# Patient Record
Sex: FEMALE | Race: BLACK OR AFRICAN AMERICAN | Employment: OTHER | URBAN - METROPOLITAN AREA
[De-identification: names, ages, dates, MRNs, and addresses within clinical notes are randomized per-mention and may not be internally consistent; named-entity substitution may affect disease eponyms.]

---

## 2017-10-31 ENCOUNTER — HOSPITAL ENCOUNTER (OUTPATIENT)
Dept: CT IMAGING | Facility: HOSPITAL | Age: 67
Discharge: HOME | End: 2017-10-31
Attending: SPECIALIST
Payer: MEDICARE

## 2017-10-31 ENCOUNTER — LAB ENCOUNTER (OUTPATIENT)
Dept: LAB | Facility: HOSPITAL | Age: 67
End: 2017-10-31
Attending: SPECIALIST
Payer: MEDICARE

## 2017-10-31 DIAGNOSIS — R10.2 SUPRAPUBIC PAIN: ICD-10-CM

## 2017-10-31 DIAGNOSIS — R10.2 SUPRAPUBIC PAIN: Primary | ICD-10-CM

## 2017-10-31 PROCEDURE — 80053 COMPREHEN METABOLIC PANEL: CPT

## 2017-10-31 PROCEDURE — 74177 CT ABD & PELVIS W/CONTRAST: CPT | Performed by: SPECIALIST

## 2017-10-31 PROCEDURE — 85025 COMPLETE CBC W/AUTO DIFF WBC: CPT

## 2017-10-31 PROCEDURE — 36415 COLL VENOUS BLD VENIPUNCTURE: CPT

## 2019-06-10 ENCOUNTER — TELEPHONE (OUTPATIENT)
Dept: SURGERY | Facility: CLINIC | Age: 69
End: 2019-06-10

## 2021-07-22 ENCOUNTER — HOSPITAL ENCOUNTER (OUTPATIENT)
Facility: HOSPITAL | Age: 71
Setting detail: OBSERVATION
Discharge: NURSING, REHAB OR LONG-TERM CARE | DRG: 563 | End: 2021-07-27
Attending: EMERGENCY MEDICINE | Admitting: INTERNAL MEDICINE
Payer: MEDICARE

## 2021-07-22 ENCOUNTER — APPOINTMENT (OUTPATIENT)
Dept: GENERAL RADIOLOGY | Facility: HOSPITAL | Age: 71
DRG: 563 | End: 2021-07-22
Attending: EMERGENCY MEDICINE
Payer: MEDICARE

## 2021-07-22 DIAGNOSIS — R52 INTRACTABLE PAIN: ICD-10-CM

## 2021-07-22 DIAGNOSIS — M17.11 OSTEOARTHRITIS OF RIGHT KNEE, UNSPECIFIED OSTEOARTHRITIS TYPE: Primary | ICD-10-CM

## 2021-07-22 DIAGNOSIS — R45.851 SUICIDAL IDEATION: ICD-10-CM

## 2021-07-22 LAB
GLUCOSE BLDC GLUCOMTR-MCNC: 128 MG/DL (ref 70–99)
GLUCOSE BLDC GLUCOMTR-MCNC: 139 MG/DL (ref 70–99)

## 2021-07-22 PROCEDURE — 96372 THER/PROPH/DIAG INJ SC/IM: CPT

## 2021-07-22 PROCEDURE — 82962 GLUCOSE BLOOD TEST: CPT

## 2021-07-22 PROCEDURE — 96374 THER/PROPH/DIAG INJ IV PUSH: CPT

## 2021-07-22 PROCEDURE — 99285 EMERGENCY DEPT VISIT HI MDM: CPT

## 2021-07-22 PROCEDURE — 73560 X-RAY EXAM OF KNEE 1 OR 2: CPT | Performed by: EMERGENCY MEDICINE

## 2021-07-22 RX ORDER — DEXTROSE MONOHYDRATE 25 G/50ML
50 INJECTION, SOLUTION INTRAVENOUS
Status: DISCONTINUED | OUTPATIENT
Start: 2021-07-22 | End: 2021-07-27

## 2021-07-22 RX ORDER — SODIUM PHOSPHATE, DIBASIC AND SODIUM PHOSPHATE, MONOBASIC 7; 19 G/133ML; G/133ML
1 ENEMA RECTAL ONCE AS NEEDED
Status: DISCONTINUED | OUTPATIENT
Start: 2021-07-22 | End: 2021-07-27

## 2021-07-22 RX ORDER — ENOXAPARIN SODIUM 100 MG/ML
40 INJECTION SUBCUTANEOUS EVERY 24 HOURS
Status: DISCONTINUED | OUTPATIENT
Start: 2021-07-22 | End: 2021-07-27

## 2021-07-22 RX ORDER — ACETAMINOPHEN 325 MG/1
650 TABLET ORAL EVERY 4 HOURS PRN
Status: DISCONTINUED | OUTPATIENT
Start: 2021-07-22 | End: 2021-07-27

## 2021-07-22 RX ORDER — GABAPENTIN 300 MG/1
300 CAPSULE ORAL 3 TIMES DAILY
Status: DISCONTINUED | OUTPATIENT
Start: 2021-07-22 | End: 2021-07-26

## 2021-07-22 RX ORDER — CYCLOBENZAPRINE HCL 10 MG
10 TABLET ORAL NIGHTLY
Status: DISCONTINUED | OUTPATIENT
Start: 2021-07-22 | End: 2021-07-26

## 2021-07-22 RX ORDER — DOCUSATE SODIUM 100 MG/1
100 CAPSULE, LIQUID FILLED ORAL 2 TIMES DAILY
Status: DISCONTINUED | OUTPATIENT
Start: 2021-07-22 | End: 2021-07-27

## 2021-07-22 RX ORDER — HYDROCODONE BITARTRATE AND ACETAMINOPHEN 5; 325 MG/1; MG/1
2 TABLET ORAL EVERY 4 HOURS PRN
Status: DISCONTINUED | OUTPATIENT
Start: 2021-07-22 | End: 2021-07-24

## 2021-07-22 RX ORDER — TRAMADOL HYDROCHLORIDE 50 MG/1
50 TABLET ORAL ONCE
Status: COMPLETED | OUTPATIENT
Start: 2021-07-22 | End: 2021-07-22

## 2021-07-22 RX ORDER — TRAMADOL HYDROCHLORIDE 50 MG/1
50 TABLET ORAL EVERY 6 HOURS PRN
Status: DISCONTINUED | OUTPATIENT
Start: 2021-07-22 | End: 2021-07-27

## 2021-07-22 RX ORDER — POLYETHYLENE GLYCOL 3350 17 G/17G
17 POWDER, FOR SOLUTION ORAL DAILY PRN
Status: DISCONTINUED | OUTPATIENT
Start: 2021-07-22 | End: 2021-07-27

## 2021-07-22 RX ORDER — BISACODYL 10 MG
10 SUPPOSITORY, RECTAL RECTAL
Status: DISCONTINUED | OUTPATIENT
Start: 2021-07-22 | End: 2021-07-27

## 2021-07-22 RX ORDER — HYDROCODONE BITARTRATE AND ACETAMINOPHEN 5; 325 MG/1; MG/1
1 TABLET ORAL EVERY 4 HOURS PRN
Status: DISCONTINUED | OUTPATIENT
Start: 2021-07-22 | End: 2021-07-27

## 2021-07-22 RX ORDER — ASPIRIN 81 MG/1
81 TABLET ORAL DAILY
Status: DISCONTINUED | OUTPATIENT
Start: 2021-07-22 | End: 2021-07-27

## 2021-07-22 RX ORDER — ATORVASTATIN CALCIUM 20 MG/1
20 TABLET, FILM COATED ORAL NIGHTLY
Status: DISCONTINUED | OUTPATIENT
Start: 2021-07-22 | End: 2021-07-27

## 2021-07-22 NOTE — ED PROVIDER NOTES
Patient Seen in: [facility] Emergency Department      History   Patient presents with:  Pain    Stated Complaint: ranjan knee pain/pain all over body     HPI/Subjective:   HPI  Patient is a 64-year-old female history of diabetes, hypertension, osteoa Normocephalic. Mouth/Throat:      Mouth: Mucous membranes are moist.   Eyes:      Extraocular Movements: Extraocular movements intact. Conjunctiva/sclera: Conjunctivae normal.      Pupils: Pupils are equal, round, and reactive to light.    Cardiov around in pain and crying. Bony tenderness over the right knee. No palpable deformity or effusion. Limited flexion and extension secondary to pain. Palpable distal pulse. No calf swelling or tenderness. No erythema or warmth.     X-ray negative for ac

## 2021-07-22 NOTE — ED QUICK NOTES
Orders for admission, patient is aware of plan and ready to go upstairs. Any questions, please call ED RN Laurita Xie at extension [phone].    Type of COVID test sent:none- vaccine card presented and added to chart  COVID Suspicion level: Low    Titratable drug(s) i

## 2021-07-22 NOTE — H&P
[address] [place] [address] Patient Status:  Emergency    1950 MRN [id number]   Location [address] Attending Shilo Lott MD   Hosp Day # 0 PCP Rebecca Chavez MD     Date: 300 mg by mouth three times daily  traMADol (ULTRAM) 50 mg PO tablet   take 50 mg by mouth four times daily as needed for Pain. lisinopril-hydrochlorothiazide 20-25 mg PO tablet   take 1 Tablet by mouth daily.     levothyroxine sodium (LEVOTHYROXINE PO) joint lines of right knee, mild tenderness to palpation of popliteal fossa  Integument: Warm, dry, no rash  Psychiatric: Appropriate mood and affect.       Results:     Lab Results   Component Value Date    WBC 13.1 (H) 10/31/2017    HGB 13.5 10/31/2017

## 2021-07-22 NOTE — ED INITIAL ASSESSMENT (HPI)
Pt comes to ER with c/o severe pain in both knees and \"all over\". Pt reports she has had this pain for one year. The pain has worsened significantly the last few days.

## 2021-07-23 ENCOUNTER — APPOINTMENT (OUTPATIENT)
Dept: GENERAL RADIOLOGY | Facility: HOSPITAL | Age: 71
DRG: 563 | End: 2021-07-23
Attending: ORTHOPAEDIC SURGERY
Payer: MEDICARE

## 2021-07-23 ENCOUNTER — APPOINTMENT (OUTPATIENT)
Dept: NUCLEAR MEDICINE | Facility: HOSPITAL | Age: 71
DRG: 563 | End: 2021-07-23
Attending: ORTHOPAEDIC SURGERY
Payer: MEDICARE

## 2021-07-23 ENCOUNTER — APPOINTMENT (OUTPATIENT)
Dept: MRI IMAGING | Facility: HOSPITAL | Age: 71
DRG: 563 | End: 2021-07-23
Attending: ORTHOPAEDIC SURGERY
Payer: MEDICARE

## 2021-07-23 LAB
ANION GAP SERPL CALC-SCNC: 6 MMOL/L (ref 0–18)
BASOPHILS # BLD AUTO: 0.08 X10(3) UL (ref 0–0.2)
BASOPHILS NFR BLD AUTO: 1 %
BUN BLD-MCNC: 8 MG/DL (ref 7–18)
BUN/CREAT SERPL: 20 (ref 10–20)
CALCIUM BLD-MCNC: 9.2 MG/DL (ref 8.5–10.1)
CHLORIDE SERPL-SCNC: 111 MMOL/L (ref 98–112)
CO2 SERPL-SCNC: 22 MMOL/L (ref 21–32)
CREAT BLD-MCNC: 0.4 MG/DL
DEPRECATED RDW RBC AUTO: 46 FL (ref 35.1–46.3)
EOSINOPHIL # BLD AUTO: 0.21 X10(3) UL (ref 0–0.7)
EOSINOPHIL NFR BLD AUTO: 2.6 %
ERYTHROCYTE [DISTWIDTH] IN BLOOD BY AUTOMATED COUNT: 12.8 % (ref 11–15)
EST. AVERAGE GLUCOSE BLD GHB EST-MCNC: 160 MG/DL (ref 68–126)
GLUCOSE BLD-MCNC: 100 MG/DL (ref 70–99)
GLUCOSE BLDC GLUCOMTR-MCNC: 101 MG/DL (ref 70–99)
GLUCOSE BLDC GLUCOMTR-MCNC: 106 MG/DL (ref 70–99)
GLUCOSE BLDC GLUCOMTR-MCNC: 111 MG/DL (ref 70–99)
GLUCOSE BLDC GLUCOMTR-MCNC: 192 MG/DL (ref 70–99)
HBA1C MFR BLD HPLC: 7.2 % (ref ?–5.7)
HCT VFR BLD AUTO: 35.3 %
HGB BLD-MCNC: 11.3 G/DL
IMM GRANULOCYTES # BLD AUTO: 0.04 X10(3) UL (ref 0–1)
IMM GRANULOCYTES NFR BLD: 0.5 %
LYMPHOCYTES # BLD AUTO: 3.59 X10(3) UL (ref 1–4)
LYMPHOCYTES NFR BLD AUTO: 44.8 %
MCH RBC QN AUTO: 31.3 PG (ref 26–34)
MCHC RBC AUTO-ENTMCNC: 32 G/DL (ref 31–37)
MCV RBC AUTO: 97.8 FL
MONOCYTES # BLD AUTO: 0.66 X10(3) UL (ref 0.1–1)
MONOCYTES NFR BLD AUTO: 8.2 %
NEUTROPHILS # BLD AUTO: 3.43 X10 (3) UL (ref 1.5–7.7)
NEUTROPHILS # BLD AUTO: 3.43 X10(3) UL (ref 1.5–7.7)
NEUTROPHILS NFR BLD AUTO: 42.9 %
OSMOLALITY SERPL CALC.SUM OF ELEC: 286 MOSM/KG (ref 275–295)
PLATELET # BLD AUTO: 405 10(3)UL (ref 150–450)
POTASSIUM SERPL-SCNC: 3.6 MMOL/L (ref 3.5–5.1)
RBC # BLD AUTO: 3.61 X10(6)UL
SODIUM SERPL-SCNC: 139 MMOL/L (ref 136–145)
WBC # BLD AUTO: 8 X10(3) UL (ref 4–11)

## 2021-07-23 PROCEDURE — 82962 GLUCOSE BLOOD TEST: CPT

## 2021-07-23 PROCEDURE — 72110 X-RAY EXAM L-2 SPINE 4/>VWS: CPT | Performed by: ORTHOPAEDIC SURGERY

## 2021-07-23 PROCEDURE — 80048 BASIC METABOLIC PNL TOTAL CA: CPT | Performed by: INTERNAL MEDICINE

## 2021-07-23 PROCEDURE — 83036 HEMOGLOBIN GLYCOSYLATED A1C: CPT | Performed by: INTERNAL MEDICINE

## 2021-07-23 PROCEDURE — 72148 MRI LUMBAR SPINE W/O DYE: CPT | Performed by: ORTHOPAEDIC SURGERY

## 2021-07-23 PROCEDURE — 85025 COMPLETE CBC W/AUTO DIFF WBC: CPT | Performed by: INTERNAL MEDICINE

## 2021-07-23 PROCEDURE — 78315 BONE IMAGING 3 PHASE: CPT | Performed by: ORTHOPAEDIC SURGERY

## 2021-07-23 PROCEDURE — 96372 THER/PROPH/DIAG INJ SC/IM: CPT

## 2021-07-23 PROCEDURE — 73502 X-RAY EXAM HIP UNI 2-3 VIEWS: CPT | Performed by: ORTHOPAEDIC SURGERY

## 2021-07-23 RX ORDER — NICOTINE 21 MG/24HR
1 PATCH, TRANSDERMAL 24 HOURS TRANSDERMAL DAILY
Status: DISCONTINUED | OUTPATIENT
Start: 2021-07-23 | End: 2021-07-27

## 2021-07-23 RX ORDER — LISINOPRIL 20 MG/1
20 TABLET ORAL DAILY
Status: DISCONTINUED | OUTPATIENT
Start: 2021-07-23 | End: 2021-07-27

## 2021-07-23 NOTE — CHRONIC PAIN
[facility]  Report of Consultation    Елена Simeon Patient Status:  Observation    1950 MRN [id number]   Location [facility] 4W/SW/SE Attending Patience Zacarias DO   Hosp Day # 0 PCP Brennan iNño MD     Date of Admissio C (DEX-4) chewable tab 4 tablet, 4 tablet, Oral, Q15 Min PRN **OR** dextrose 50 % injection 50 mL, 50 mL, Intravenous, Q15 Min PRN **OR** glucose (DEX4) oral liquid 30 g, 30 g, Oral, Q15 Min PRN **OR** Glucose-Vitamin C (DEX-4) chewable tab 8 tablet, 8 tab Laboratory Data:       Imaging:  Narrative   PROCEDURE: XR KNEE (1 OR 2 VIEWS), RIGHT (CPT=73560)       COMPARISON: None.       INDICATIONS: Bilateral knee pain x 1 year.  Worsening right knee pain post fall x 1 day.       TECHNIQUE: 2 views were obtain comprehensive interactive discussion. All questions were answered during extended questions and answer session. Patient agreeable to discussion plan.  Greater than 50% of the time was spent with counseling (nature of discussion centered around pain, therapy

## 2021-07-23 NOTE — BH PROGRESS NOTE
Behavioral Health SOAP Note  OBJECTIVE  Conscious/Orientation: alert and oriented x3-4  Appearance: stated age female laying on her side in hospital bed  Behavior: No psychomotor abnormality  Cooperation: receptive, cooperative  Speech: normal rate, rhythm in ED and met with Danyelle Later at bedside for consultation.    Danyelle Later today reports that she went to multiple hospitals before coming to [address] [place] [address] because she wanted somebody to check out her knee and give her a proper treatment plan but was \"met with a bottle of pil

## 2021-07-23 NOTE — OCCUPATIONAL THERAPY NOTE
OT orders received and chart reviewed. Pt admitted w/ R knee pain:L hip/knee pain. MRI pending for R knee and spine.  OT eval deferred awaiting results

## 2021-07-23 NOTE — PHYSICAL THERAPY NOTE
MD orders received and chart reviewed, ortho consult pending. Patient with need for multiple imaging and pain service will see patient later this morning as well. Will check back later today to see if appropriate to work with PT. RN is aware.     1502: Marisol

## 2021-07-23 NOTE — CONSULTS
[facility]    PATIENT'S NAME: Ella Rushing   ATTENDING PHYSICIAN: Terry Bernal DO   CONSULTING PHYSICIAN: Royal Arenas MD   PATIENT ACCOUNT#:   [de-identified]    LOCATION:  [address] [place] [address] Drive #:   [id number]       DATE OF B , and breast biopsies. MEDICATIONS:  Aspirin, Flexeril, Neurontin, Lipitor, Ultram, glucose, Dex4, Lovenox, Norco, MiraLAX, Colace, NovoLog insulin, Lidoderm patch, doses of which are enumerated in the chart.     ALLERGIES:  No known drug allerg be dependent upon the results of the above. Thank you for the consultation.     Dictated By Brannon Bamberger, MD  d: 07/23/2021 07:53:09  t: 07/23/2021 08:14:56  [facility] [id number]  [facility]/

## 2021-07-23 NOTE — PLAN OF CARE
Pt with c/o pain in right knee, left hip & knee. This am c/o pain in posterior neck & back. States she's had this pain since she fell. Medicated with Norco & tramadol prn. Weakness noted in legs. Fall precautions in place.  Call light in reach & bed alarm consult as needed  - Instruct patient on self management of diabetes  7/23/2021 0354 by Saray Valentino RN  Outcome: Progressing  7/23/2021 0353 by Saray Valentino RN  Outcome: Progressing     Problem: PAIN - ADULT  Goal: Verbalizes/displays adequate c INTERVENTIONS:  - Identify barriers to discharge w/pt and caregiver  - Include patient/family/discharge partner in discharge planning  - Arrange for needed discharge resources and transportation as appropriate  - Identify discharge learning needs (meds, wo

## 2021-07-23 NOTE — CM/SW NOTE
CM met w/pt for dc planning d/t case finding and dx. Pt stated address & ph# are correct. Pt lives with dtr/Anne Marie. Per pt awaiting to get MRI and see orthopedic doctor. Pt has no hx with HH or [facility].     Pt had hx w/psych tx for alcohol and cocaine w

## 2021-07-23 NOTE — PLAN OF CARE
Pt A+Ox4, VSS, saturating well on room air, pain controlled with PRN norco. Offered tramadol, however declined. Lidocaine patch to L hip. Nicotine patch to L upper arm. Voiding via purewick. Bed in lowest position and locked, call light within reach.  [address] appropriate pain scale  - Administer analgesics based on type and severity of pain and evaluate response  - Implement non-pharmacological measures as appropriate and evaluate response  - Consider cultural and social influences on pain and pain management Complete POLST form as appropriate  - Assess patient's ability to be responsible for managing their own health  - Refer to Case Management Department for coordinating discharge planning if the patient needs post-hospital services based on physician/LIP ord

## 2021-07-24 ENCOUNTER — APPOINTMENT (OUTPATIENT)
Dept: MRI IMAGING | Facility: HOSPITAL | Age: 71
DRG: 563 | End: 2021-07-24
Attending: ORTHOPAEDIC SURGERY
Payer: MEDICARE

## 2021-07-24 LAB
GLUCOSE BLDC GLUCOMTR-MCNC: 116 MG/DL (ref 70–99)
GLUCOSE BLDC GLUCOMTR-MCNC: 137 MG/DL (ref 70–99)
GLUCOSE BLDC GLUCOMTR-MCNC: 159 MG/DL (ref 70–99)
GLUCOSE BLDC GLUCOMTR-MCNC: 189 MG/DL (ref 70–99)

## 2021-07-24 PROCEDURE — 82962 GLUCOSE BLOOD TEST: CPT

## 2021-07-24 PROCEDURE — 73721 MRI JNT OF LWR EXTRE W/O DYE: CPT | Performed by: ORTHOPAEDIC SURGERY

## 2021-07-24 PROCEDURE — 96372 THER/PROPH/DIAG INJ SC/IM: CPT

## 2021-07-24 RX ORDER — HYDROCODONE BITARTRATE AND ACETAMINOPHEN 10; 325 MG/1; MG/1
1 TABLET ORAL EVERY 4 HOURS PRN
Status: DISCONTINUED | OUTPATIENT
Start: 2021-07-24 | End: 2021-07-27

## 2021-07-24 NOTE — PROGRESS NOTES
[facility]    Progress Note    Aisha Hatch Patient Status:  Observation    1950 MRN [id number]   Location [facility] 4W/SW/SE Attending Elinor Vicente, [address] [facility] Day # 0 PCP Dru Vasquez MD       Subjective:   Sabino Alt rash  Psychiatric: Appropriate mood and affect.     Results:     Lab Results   Component Value Date    WBC 8.0 07/23/2021    HGB 11.3 (L) 07/23/2021    HCT 35.3 07/23/2021    .0 07/23/2021    CREATSERUM 0.40 (L) 07/23/2021    BUN 8 07/23/2021    NA 1 bilateral foraminal impingement. 2. At and distal to the region of severe spinal canal stenosis, the sacrococcygeal nerve roots appears thickened.   3. Decompressive laminectomy defects are demonstrated at L3 and L4.  4. Additional degenerative changes of impingement is not correspond to her region of pain.     Suicidal ideation  -Patient is not actively suicidal, she was just frustrated with management of chronic right knee pain     Tobacco abuse  -Continue nicotine patch     Chronic stable conditions:  Kami Nesbitt

## 2021-07-24 NOTE — PLAN OF CARE
MRI completed and MDs aware results. Dr Lissy Wright spoke with patient and daughter about knee injection planned for tomorrow. Lovenox for DVT Prophylaxis. Norco, Lidoderm to left hip, Ultram, Neurontin  for pain. Purewick in use. ACHS.  DC plan to be determin Verbalizes/displays adequate comfort level or patient's stated pain goal  Description: INTERVENTIONS:  - Encourage pt to monitor pain and request assistance  - Assess pain using appropriate pain scale  - Administer analgesics based on type and severity of learning needs (meds, wound care, etc)  - Arrange for interpreters to assist at discharge as needed  - Consider post-discharge preferences of patient/family/discharge partner  - Complete POLST form as appropriate  - Assess patient's ability to be responsib

## 2021-07-24 NOTE — PROGRESS NOTES
[facility]    Progress Note    Maeve Gracia Patient Status:  Observation    1950 MRN [id number]   Location [facility] 4W/SW/SE Attending Laretta Runner, DO   Hosp Day # 0 PCP Martha Baugh MD     Date of Admission:   oral liquid 30 g, 30 g, Oral, Q15 Min PRN   Or  Glucose-Vitamin C (DEX-4) chewable tab 8 tablet, 8 tablet, Oral, Q15 Min PRN  Enoxaparin Sodium (LOVENOX) 40 MG/0.4ML injection 40 mg, 40 mg, Subcutaneous, Q24H  acetaminophen (TYLENOL) tab 650 mg, 650 mg, Or CREATSERUM 0.40 (L) 07/23/2021    BUN 8 07/23/2021     07/23/2021    K 3.6 07/23/2021     07/23/2021    CO2 22.0 07/23/2021     (H) 07/23/2021    CA 9.2 07/23/2021    ALB 3.6 10/31/2017    ALKPHO 123 10/31/2017    TP 7.4 10/31/2017    AS Finalized by (CST): Roselia Ugarte MD on 7/24/2021 at 8:29 AM          NM BONE SCAN THREE PHASE  (TQS=66575)    Result Date: 7/23/2021  CONCLUSION: Abnormal triple phase bone scan demonstrating hyperemia and increased uptake in the medial left tibial p not correlate directly with her symptoms) and various treatment options.           Would recommend conservative treatment, particularly given the radicular symptoms that she has been experiencing in the significant MRI findings on her lumbar sacral spine; t

## 2021-07-24 NOTE — PLAN OF CARE
A&O x4.  C/o pain in both knees & left hip. Medicated with [place] prn. MRI of Lumbar spine completed. MRI of right knee to be done this am. Augustin glover & hs. Jovanni in place.     Problem: Patient Centered Care  Goal: Patient preferences are identified and non-pharmacological measures as appropriate and evaluate response  - Consider cultural and social influences on pain and pain management  - Manage/alleviate anxiety  - Utilize distraction and/or relaxation techniques  - Monitor for opioid side effects  - N Refer to Case Management Department for coordinating discharge planning if the patient needs post-hospital services based on physician/LIP order or complex needs related to functional status, cognitive ability or social support system  Outcome: Progressing

## 2021-07-24 NOTE — PHYSICAL THERAPY NOTE
Therapy orders received, chart reviewed. MRI results received indicating lateral meniscal tear, pending ortho recommendations for management. Will follow up as patient status permits. Thank you.      MRI KNEE, RIGHT (TLH=20047)     Result Date: 7/24/2021  C Rotation Flap Text: The defect edges were debeveled with a #15 scalpel blade.  Given the location of the defect, shape of the defect and the proximity to free margins a rotation flap was deemed most appropriate.  Using a sterile surgical marker, an appropriate rotation flap was drawn incorporating the defect and placing the expected incisions within the relaxed skin tension lines where possible.    The area thus outlined was incised deep to adipose tissue with a #15 scalpel blade.  The skin margins were undermined to an appropriate distance in all directions utilizing iris scissors.

## 2021-07-25 LAB
GLUCOSE BLDC GLUCOMTR-MCNC: 127 MG/DL (ref 70–99)
GLUCOSE BLDC GLUCOMTR-MCNC: 130 MG/DL (ref 70–99)
GLUCOSE BLDC GLUCOMTR-MCNC: 146 MG/DL (ref 70–99)
GLUCOSE BLDC GLUCOMTR-MCNC: 156 MG/DL (ref 70–99)

## 2021-07-25 PROCEDURE — 97166 OT EVAL MOD COMPLEX 45 MIN: CPT

## 2021-07-25 PROCEDURE — 97530 THERAPEUTIC ACTIVITIES: CPT

## 2021-07-25 PROCEDURE — 97162 PT EVAL MOD COMPLEX 30 MIN: CPT

## 2021-07-25 PROCEDURE — 82962 GLUCOSE BLOOD TEST: CPT

## 2021-07-25 PROCEDURE — 3E0U33Z INTRODUCTION OF ANTI-INFLAMMATORY INTO JOINTS, PERCUTANEOUS APPROACH: ICD-10-PCS | Performed by: ORTHOPAEDIC SURGERY

## 2021-07-25 PROCEDURE — 0S9C3ZX DRAINAGE OF RIGHT KNEE JOINT, PERCUTANEOUS APPROACH, DIAGNOSTIC: ICD-10-PCS | Performed by: ORTHOPAEDIC SURGERY

## 2021-07-25 PROCEDURE — 3E0U3BZ INTRODUCTION OF ANESTHETIC AGENT INTO JOINTS, PERCUTANEOUS APPROACH: ICD-10-PCS | Performed by: ORTHOPAEDIC SURGERY

## 2021-07-25 PROCEDURE — 96372 THER/PROPH/DIAG INJ SC/IM: CPT

## 2021-07-25 PROCEDURE — 97535 SELF CARE MNGMENT TRAINING: CPT

## 2021-07-25 RX ORDER — METHYLPREDNISOLONE ACETATE 80 MG/ML
80 INJECTION, SUSPENSION INTRA-ARTICULAR; INTRALESIONAL; INTRAMUSCULAR; SOFT TISSUE ONCE
Status: COMPLETED | OUTPATIENT
Start: 2021-07-25 | End: 2021-07-25

## 2021-07-25 RX ORDER — BUPIVACAINE HYDROCHLORIDE 2.5 MG/ML
50 INJECTION, SOLUTION INFILTRATION; PERINEURAL ONCE
Status: COMPLETED | OUTPATIENT
Start: 2021-07-25 | End: 2021-07-25

## 2021-07-25 NOTE — PHYSICAL THERAPY NOTE
Chart reviewed :     Requesting from ortho MD today prior to therapy participation via [employer] messaging. PT order received from primary care MD . Veronica Simental MD is following, reviewed ortho plan notes.  Looks like ortho is planning procedure /injection for rig

## 2021-07-25 NOTE — PHYSICAL THERAPY NOTE
PHYSICAL THERAPY EVALUATION - INPATIENT     Room Number: 408/408-A  Evaluation Date: 7/25/2021  Type of Evaluation: Initial   Physician Order: PT Eval and Treat    Presenting Problem: OA/lateral meniscus tear right knee , pt is WBAT for right knee followi daughter assists her. Patient has been using a RW with limited tolerance for  Household ambulation. Pt reports rarely will leave home . Pt reports she climbs the stairs on her hands and knees if she has to get out.   Pt reports recent fall when her right L activity  . DC plans pending progress with therapy , pt and pt family goals of care , support in home and further acute management. SW to work with pt on optimal DC Plan.       Patient will benefit from continued IP PT services to address these deficits bilateral hip pain (right greater than left osteoarthritis) and contralateral intermittent knee pain, activity related and secondary to arthritis.           Would recommend at this time a trial of conservative treatment, particularly given the radicular and time given her lack of direct clinical symptoms.     4.    Chronic narcotic use--Pain Clinic consultation     5.    Suicidal ideations--would recommend Psychiatric consultation                 Digital transcription software was utilized to produce this not techniques    COGNITION  · Overall Cognitive Status:  WFL - within functional limits    RANGE OF MOTION AND STRENGTH ASSESSMENT      Pt B UE and left LE ROM and strength grossly WFL for fxn mobility     Pt with decrease right knee/LE  ROM and strength rela CK    FUNCTIONAL ABILITY STATUS  Gait Assessment --Ambulation not assessed   SPT with RW bed to chair with pt taking only a few steps  ---pain limiting ability to assess ambulation at this time   Gait Assistance: Minimum assistance (mod patient effort poor

## 2021-07-25 NOTE — OCCUPATIONAL THERAPY NOTE
OCCUPATIONAL THERAPY EVALUATION - INPATIENT      Room Number: 408/408-A  Evaluation Date: 7/25/2021  Type of Evaluation: Initial  Presenting Problem:  (right lateral meniscus tear)    Physician Order: IP Consult to Occupational Therapy  Reason for Therapy: therapeutic activity to challenge functional status       The patient's Approx Degree of Impairment: 53.32% has been calculated based on documentation in the [facility] '6 clicks' Inpatient Daily Activity Short Form.   Research supports that patients with this l THERAPY EXAMINATION     OBJECTIVE  Precautions: Spine;Limb alert - right;Bed/chair alarm  Fall Risk: High fall risk    WEIGHT BEARING RESTRICTION  Weight Bearing Restriction: R lower extremity        R Lower Extremity: Weight Bearing as Tolerated       CYNDY Session: Up in chair;Needs met;Call light within reach;RN aware of session/findings; All patient questions and concerns addressed    OT Goals  Patient self-stated goal is: to reduce pain     Patient will complete LE dressing with AE prn and SBA  Comment:

## 2021-07-25 NOTE — PROGRESS NOTES
[facility]    Progress Note    Rosey Leisure Patient Status:  Observation    1950 MRN [id number]   Location [facility] 4W/SW/SE Attending Tolu Fu DO   Hosp Day # 0 PCP Rebecca Chavez MD     Date of Admission:   tab 50 mg, 50 mg, Oral, Q6H PRN  glucose (DEX4) oral liquid 15 g, 15 g, Oral, Q15 Min PRN   Or  Glucose-Vitamin C (DEX-4) chewable tab 4 tablet, 4 tablet, Oral, Q15 Min PRN   Or  dextrose 50 % injection 50 mL, 50 mL, Intravenous, Q15 Min PRN   Or  glucose examination and in no apparent distress. Her vital signs are as enumerated above. Her physical examination is unchanged.   Laboratory Data:  Lab Results   Component Value Date    WBC 8.0 07/23/2021    HGB 11.3 (L) 07/23/2021    HCT 35.3 07/23/2021    PLT (WEZ=[id number])    Result Date: 7/23/2021  CONCLUSION: Abnormal triple phase bone scan demonstrating hyperemia and increased uptake in the medial left tibial plateau. Differential is degenerative changes versus occult trauma.   Correlation with radiographs is this time a trial of conservative treatment, particularly given the radicular and groin symptoms that she has been experiencing, the significant MRI findings on her lumbar sacral spine; the advanced arthritic changes in her right greater than left hip, the consultation             Digital transcription software was utilized to produce this note. The note was proofread for content only. Typographical errors may remain.       Aly Floyd MD  7/25/2021

## 2021-07-25 NOTE — PLAN OF CARE
Nguyen Lares is hospital day #3- S/P fall w bilateral knee pain- more in rt knee. C/O hip/groing pain also. Ortho aware. Alert and ox4. Neuropathy to feet . Hx diabetes- BG AC/HS. Sliding scale as needed.  Ortho performed rt knee aspiration and steroid injection Implement neutropenic guidelines  Outcome: Progressing- no s/s infection     Problem: SAFETY ADULT - FALL  Goal: Free from fall injury  Description: INTERVENTIONS:  - Assess pt frequently for physical needs  - Identify cognitive and physical deficits and b

## 2021-07-25 NOTE — PROGRESS NOTES
[facility]    Progress Note    Aisha Hatch Patient Status:  Observation    1950 MRN [id number]   Location [facility] 4W/SW/SE Attending Elinor Vicente, [address] [facility] Day # 0 PCP Dru Vasquez MD       Subjective:   Sabino Alt tenderness on palpation of medial joint line of right knee as well as popliteal fossa  Integument: Warm, dry, no rash  Psychiatric: Appropriate mood and affect.     Results:     Lab Results   Component Value Date    WBC 8.0 07/23/2021    HGB 11.3 (L) 07/23/ Assessment and Plan:   Assessment     Intractable right knee pain  -MRI knee shows lateral meniscal tear as well as tricompartmental arthritis  -X-ray of right hip reveals severe osteoarthritis, which could cause referred pain to the knee  -Steroid

## 2021-07-26 LAB
GLUCOSE BLDC GLUCOMTR-MCNC: 115 MG/DL (ref 70–99)
GLUCOSE BLDC GLUCOMTR-MCNC: 134 MG/DL (ref 70–99)
GLUCOSE BLDC GLUCOMTR-MCNC: 168 MG/DL (ref 70–99)
GLUCOSE BLDC GLUCOMTR-MCNC: 190 MG/DL (ref 70–99)

## 2021-07-26 PROCEDURE — 97530 THERAPEUTIC ACTIVITIES: CPT

## 2021-07-26 PROCEDURE — 96372 THER/PROPH/DIAG INJ SC/IM: CPT

## 2021-07-26 PROCEDURE — 82962 GLUCOSE BLOOD TEST: CPT

## 2021-07-26 PROCEDURE — 97116 GAIT TRAINING THERAPY: CPT

## 2021-07-26 RX ORDER — NORTRIPTYLINE HYDROCHLORIDE 10 MG/1
10 CAPSULE ORAL NIGHTLY
Status: DISCONTINUED | OUTPATIENT
Start: 2021-07-26 | End: 2021-07-27

## 2021-07-26 RX ORDER — GABAPENTIN 400 MG/1
400 CAPSULE ORAL 3 TIMES DAILY
Status: DISCONTINUED | OUTPATIENT
Start: 2021-07-26 | End: 2021-07-27

## 2021-07-26 NOTE — PAYOR COMM NOTE
--------------  ADMISSION REVIEW     Payor: Teresa Pritchett Eure #:  [id number]  Authorization Number: [id number]    Admit date: 7/25/21  Admit time:  3:26 PM     Admit Orders (From admission, onward)     Start     Ordered    07/25/21 1526 3.  HEENT: Moist mucous membranes. EOM-I. Respiratory: Clear to auscultation bilaterally. No wheezes, rales, rhonchi. Cardiovascular: Regular rate and rhythm.   4/6 LALITO, no lower extremity edema  Abdomen: Soft, nontender, nondistended, normoactive bowel Patient has been eating well, but has not had a bowel movement since admission. She plans to take MiraLAX today     Objective:   Blood pressure 156/78, pulse 64, temperature 98.1 °F (36.7 °C), temperature source Oral, resp.  rate 17, SpO2 99 %, not curren bilateral foraminal impingement. 2. At and distal to the region of severe spinal canal stenosis, the sacrococcygeal nerve roots appears thickened.   3. Decompressive laminectomy defects are demonstrated at L3 and L4.  4. Additional degenerative changes of MG/0.4ML injection 40 mg     Date Action Dose Route User    7/25/2021 1635 Given 40 mg Subcutaneous (Left Lower Abdomen) Dory Ma, RN      gabapentin (NEURONTIN) cap 300 mg     Date Action Dose Route User    7/26/2021 0911 Given 300 mg Oral Alana Mcdonald

## 2021-07-26 NOTE — PHYSICAL THERAPY NOTE
PHYSICAL THERAPY TREATMENT NOTE - INPATIENT     Room Number: 964/453-X       Presenting Problem: OA/lateral meniscus tear right knee , pt is WBAT for right knee following ortho consultation/post injection 7/25/21.   Ortho MD cleared pt for therapy today wit Lower Extremity: Weight Bearing as Tolerated       PAIN ASSESSMENT   Rating: Unable to rate  Location: at rest 6/10   with activity 8/10   right knee , groin , hip and back area   Management Techniques: Activity promotion; Body mechanics; Relaxation;Repositi - sit EOB @ level: supervision     Goal #1   Current Status  Min a    Goal #2 Patient is able to demonstrate transfers EOB to/from Chair/Wheelchair at assistance level: supervision with walker - rolling improved tolerance and posture      Goal #2  Current

## 2021-07-26 NOTE — PROGRESS NOTES
[facility]    Progress Note    Susie Abad Patient Status:  Inpatient    1950 MRN [id number]   Location [facility] 4W/SW/SE Attending Monica Malik, [address] [facility] Day # 1 PCP Christina Stanley MD     Date of Admission:  / TID  atorvastatin (LIPITOR) tab 20 mg, 20 mg, Oral, Nightly  traMADol HCl (ULTRAM) tab 50 mg, 50 mg, Oral, Q6H PRN  glucose (DEX4) oral liquid 15 g, 15 g, Oral, Q15 Min PRN   Or  Glucose-Vitamin C (DEX-4) chewable tab 4 tablet, 4 tablet, Oral, Q15 Min PRN (Oral)   Resp 16   SpO2 99%   She is alert, oriented, and appropriate throughout the examination and in no apparent distress. Her vital signs are as enumerated above. Good active and passive motion of the right knee is appreciated.   No gross effusion is discussed extensively the likely contributing factors of radicular symptoms given the grade IV spondylolisthesis/significant spinal stenosis, bilateral hip pain (right greater than left osteoarthritis) and contralateral intermittent knee pain, activity rel injection.             2.   Grade IV L5-S1 spondylolisthesis with advanced spinal stenosis--appreciate Neurosurgical Consultation.     3.   Osteoarthritis bilateral hips, right greater than left, and left knee--would treat conservatively at this time given

## 2021-07-26 NOTE — PROGRESS NOTES
[facility]    Progress Note    Green Valleymarco Bonilla Patient Status:  Inpatient    1950 MRN [id number]   Location [facility] 4W/SW/SE Attending Avelino Weber, [address] [facility] Day # 1 PCP Wayne Mann MD       Subjective:   Dominick Garcia tenderness/mass/nodules  Pulmonary:  clear to auscultation bilaterally. No wheezes,rales or rhonchi. Cardiovascular: S1, S2 normal, no click, rub or gallop, regular rate and rhythm. Grade 3/6 holosystolic murmur at the left sternal border.   Abdominal: s nighttime for overnight discomfort related to neuropathy.     Suicidal ideation  -Patient is not actively suicidal, she was just frustrated with management of chronic right knee pain     Tobacco abuse  -Continue nicotine patch     Chronic stable conditions

## 2021-07-26 NOTE — PLAN OF CARE
Pt A+Ox4, VSS, saturating well on room air, pain controlled with PRN norco and tramadol. Lidocaine patch to L hip. Nicotine patch on L shoulder. Up with x1 assist and walker. Currently in bed, call light within reach. Daughter at bedside.      Problem: Marisol of pain and evaluate response  - Implement non-pharmacological measures as appropriate and evaluate response  - Consider cultural and social influences on pain and pain management  - Manage/alleviate anxiety  - Utilize distraction and/or relaxation techniq responsible for managing their own health  - Refer to Case Management Department for coordinating discharge planning if the patient needs post-hospital services based on physician/LIP order or complex needs related to functional status, cognitive ability o

## 2021-07-26 NOTE — PLAN OF CARE
Problem: Patient Centered Care  Goal: Patient preferences are identified and integrated in the patient's plan of care  Description: Interventions:  - What would you like us to know as we care for you?   - Provide timely, complete, and accurate informatio Administer growth factors as ordered  - Implement neutropenic guidelines  Outcome: Progressing     Problem: SAFETY ADULT - FALL  Goal: Free from fall injury  Description: INTERVENTIONS:  - Assess pt frequently for physical needs  - Identify cognitive and p

## 2021-07-26 NOTE — CM/SW NOTE
MET W/patient and dtr at bedside and educated on LEANDER list process. Received call to and chose [facility]. CM spoke w/[facility] liaison who will do onsite evaluation. CM reserved in aidin. LEANDER needs insurance auth.     PLAN:  LEANDER, NEED INSURANCE A

## 2021-07-26 NOTE — CONSULTS
[facility] - [facility]    Neurosurgery Consultation    Michelle Pereira Patient Status:  Inpatient    1950 MRN [id number]   Location [facility] 4W/SW/SE Attending William Edwards, [address] [facility] Day # 1 PCP Alona Gray MD     Date of Adm Nightly  •  gabapentin (NEURONTIN) cap 300 mg, 300 mg, Oral, TID  •  atorvastatin (LIPITOR) tab 20 mg, 20 mg, Oral, Nightly  •  traMADol HCl (ULTRAM) tab 50 mg, 50 mg, Oral, Q6H PRN  •  glucose (DEX4) oral liquid 15 g, 15 g, Oral, Q15 Min PRN **OR** Glucos knee pain  Sensation symmetrical to light touch. Lumbar midline incision healed well. Abdomen:  Soft, non-distended, non-tender, with no rebound or guarding. No peritoneal signs. Extremities:  Non-tender, no lower extremity edema noted.       Lab findings as above. A preliminary report was issued by the [address] [place] [address] Radiology teleradiology service. There are no major discrepancies.    Dictated by (CST): Kd Metz MD on 7/24/2021 at 8:21 AM     Finalized by (CST): Kd Metz MD on 7/24/2021 a surgery. No f/u with me at this time. More than 60 minutes were spent in consultation and coordination of this patient's care. All questions and concerns were addressed. We appreciate the opportunity to participate in the care of this patient.  Please d

## 2021-07-27 VITALS
SYSTOLIC BLOOD PRESSURE: 142 MMHG | DIASTOLIC BLOOD PRESSURE: 67 MMHG | RESPIRATION RATE: 18 BRPM | TEMPERATURE: 98 F | OXYGEN SATURATION: 100 % | HEART RATE: 64 BPM

## 2021-07-27 PROBLEM — R45.851 SUICIDAL IDEATION: Status: RESOLVED | Noted: 2021-07-22 | Resolved: 2021-07-27

## 2021-07-27 LAB
GLUCOSE BLDC GLUCOMTR-MCNC: 130 MG/DL (ref 70–99)
GLUCOSE BLDC GLUCOMTR-MCNC: 135 MG/DL (ref 70–99)
SARS-COV-2 RNA RESP QL NAA+PROBE: NOT DETECTED

## 2021-07-27 PROCEDURE — 97116 GAIT TRAINING THERAPY: CPT

## 2021-07-27 PROCEDURE — 97110 THERAPEUTIC EXERCISES: CPT

## 2021-07-27 PROCEDURE — 82962 GLUCOSE BLOOD TEST: CPT

## 2021-07-27 PROCEDURE — 97530 THERAPEUTIC ACTIVITIES: CPT

## 2021-07-27 RX ORDER — LISINOPRIL 20 MG/1
20 TABLET ORAL DAILY
Qty: 30 TABLET | Refills: 2 | Status: SHIPPED | OUTPATIENT
Start: 2021-07-28

## 2021-07-27 RX ORDER — NICOTINE 21 MG/24HR
1 PATCH, TRANSDERMAL 24 HOURS TRANSDERMAL DAILY
Qty: 30 PATCH | Refills: 1 | Status: SHIPPED | OUTPATIENT
Start: 2021-07-28

## 2021-07-27 RX ORDER — NORTRIPTYLINE HYDROCHLORIDE 10 MG/1
10 CAPSULE ORAL NIGHTLY
Qty: 30 CAPSULE | Refills: 2 | Status: SHIPPED | OUTPATIENT
Start: 2021-07-27

## 2021-07-27 RX ORDER — GABAPENTIN 400 MG/1
400 CAPSULE ORAL 3 TIMES DAILY
Qty: 90 CAPSULE | Refills: 2 | Status: SHIPPED | OUTPATIENT
Start: 2021-07-27

## 2021-07-27 RX ORDER — ACETAMINOPHEN 325 MG/1
650 TABLET ORAL EVERY 4 HOURS PRN
Qty: 60 TABLET | Refills: 0 | Status: SHIPPED | OUTPATIENT
Start: 2021-07-27

## 2021-07-27 RX ORDER — HYDROCODONE BITARTRATE AND ACETAMINOPHEN 10; 325 MG/1; MG/1
1 TABLET ORAL EVERY 8 HOURS PRN
Qty: 30 TABLET | Refills: 0 | Status: SHIPPED | OUTPATIENT
Start: 2021-07-27

## 2021-07-27 RX ORDER — POLYETHYLENE GLYCOL 3350 17 G/17G
17 POWDER, FOR SOLUTION ORAL DAILY PRN
Qty: 300 G | Refills: 0 | Status: SHIPPED | OUTPATIENT
Start: 2021-07-27

## 2021-07-27 RX ORDER — PSEUDOEPHEDRINE HCL 30 MG
100 TABLET ORAL 2 TIMES DAILY
Qty: 60 CAPSULE | Refills: 2 | Status: SHIPPED | OUTPATIENT
Start: 2021-07-27

## 2021-07-27 NOTE — PLAN OF CARE
Pt A+Ox4, VSS, saturating well on room air, pain controlled with PRN Norco. Lidocaine patch to L hip. Nicotine patch on L shoulder. Up with x1 assist and walker. Currently up in chair, call light within reach. Daughter at bedside.  Plan is to discharge to  assistance  - Assess pain using appropriate pain scale  - Administer analgesics based on type and severity of pain and evaluate response  - Implement non-pharmacological measures as appropriate and evaluate response  - Consider cultural and social influenc post-discharge preferences of patient/family/discharge partner  - Complete POLST form as appropriate  - Assess patient's ability to be responsible for managing their own health  - Refer to Case Management Department for coordinating discharge planning if t

## 2021-07-27 NOTE — CM/SW NOTE
MARY followed up on DC planning. SW received confirmation of insurance authorization. MARY confirmed with RN that pt is medically ready for discharge today. MARY called and spoke with Alfred Stanley from [facility] to arrange a time for discharge.  RN is aware of di

## 2021-07-27 NOTE — PHYSICAL THERAPY NOTE
PHYSICAL THERAPY TREATMENT NOTE - INPATIENT     Room Number: 619/001-H       Presenting Problem: OA/lateral meniscus tear right knee , pt is WBAT for right knee following ortho consultation/post injection 7/25/21.   Ortho MD cleared pt for therapy today wit Restriction: R lower extremity        R Lower Extremity: Weight Bearing as Tolerated       PAIN ASSESSMENT   Rating: Unable to rate  Location: at rest 6/10   with activity 8/10   right knee , groin , hip and back area   Management Techniques:  Activity prom is: none stated    Goal #1 Patient is able to demonstrate supine - sit EOB @ level: supervision     Goal #1   Current Status2 Min a   Goal #2 Patient is able to demonstrate transfers EOB to/from Chair/Wheelchair at assistance level: supervision with walker

## 2021-07-27 NOTE — PROGRESS NOTES
[facility]    Progress Note    Kimosvaldo Tha Patient Status:  Inpatient    1950 MRN [id number]   Location [facility] 4W/SW/SE Attending Sade Culver, [address] [facility] Day # 2 PCP Katherine Crowder MD       Subjective:   John Badillo adenopathy,  supple, symmetrical, trachea midline and thyroid not enlarged, symmetric, no tenderness/mass/nodules  Pulmonary:  clear to auscultation bilaterally. No wheezes,rales or rhonchi.    Cardiovascular: S1, S2 normal, no click, rub or gallop, regular time.  Should she reconsider he recommended she be evaluated by [facility] or [facility]. -Patient is on 400 mg 3 times daily of Neurontin. –To add Pamelor at low-dose at nighttime for overnight discomfort related to neuropathy.   We will keep at the 10 mg dose t

## 2021-07-28 ENCOUNTER — SNF ADMIT/H&P (OUTPATIENT)
Dept: INTERNAL MEDICINE CLINIC | Facility: SKILLED NURSING FACILITY | Age: 71
End: 2021-07-28

## 2021-07-28 DIAGNOSIS — K59.03 DRUG-INDUCED CONSTIPATION: ICD-10-CM

## 2021-07-28 DIAGNOSIS — M17.11 OSTEOARTHRITIS OF RIGHT KNEE, UNSPECIFIED OSTEOARTHRITIS TYPE: ICD-10-CM

## 2021-07-28 DIAGNOSIS — Z09 FOLLOW UP: ICD-10-CM

## 2021-07-28 DIAGNOSIS — Z51.89 ENCOUNTER FOR MEDICATION ADJUSTMENT: ICD-10-CM

## 2021-07-28 DIAGNOSIS — Z71.89 ENCOUNTER FOR MEDICATION REVIEW AND COUNSELING: ICD-10-CM

## 2021-07-28 DIAGNOSIS — I10 ESSENTIAL HYPERTENSION: ICD-10-CM

## 2021-07-28 DIAGNOSIS — M54.16 LUMBAR RADICULOPATHY: ICD-10-CM

## 2021-07-28 DIAGNOSIS — M25.561 ACUTE PAIN OF RIGHT KNEE: ICD-10-CM

## 2021-07-28 DIAGNOSIS — R52 INTRACTABLE PAIN: ICD-10-CM

## 2021-07-28 PROCEDURE — 1111F DSCHRG MED/CURRENT MED MERGE: CPT | Performed by: NURSE PRACTITIONER

## 2021-07-28 PROCEDURE — 99310 SBSQ NF CARE HIGH MDM 45: CPT | Performed by: NURSE PRACTITIONER

## 2021-07-28 NOTE — PAYOR COMM NOTE
Discharge Notification    Patient Name: Otis Echavarria #: [id number]  Authorization Number: [id number]  Admit Date/Time: 7/22/2021 1:25 PM  Discharge Date/Time: 7/27/2021 3:38 PM

## 2021-07-28 NOTE — PROGRESS NOTES
Aisha Hatch  : 1950  Age 66year old  female patient is admitted to [facility] for LEANDER. Reason For Visit: Initial APRN Assessment/Follow up bilateral knee pain and back pain. C/O Constipation and elevated BP.     HPI  Pa prior to antihypertensives, reports she was in pain and upset for no one answering her call light and attending to her needs,complaints discussed with DON, verbalized understanding. BP rechecked by nurse post medications, 161/85, will monitor. Also reports c wheezes or crackles. Abd: Soft, NTND, BS normal, no mass, no rebound/guarding. EXTREMITIES: No peripheral edema. No cyanosis, 2+ pedal pulses. MUSCULOSKELETAL: No acute synovitis in bilateral upper and lower extremities. Back:No tenderness.   Skin: Warm Daily  -Monitor BP Q shift. H/o Hypothyroidism  -Not on meds  -Monitor labs    Hyperlipidemia  –CPM  Pravastatin 80mg at bedtime.   -CPM ASA 81mg Daily                Simon Grace, APRN  7/28/2021      75 total minutes spent with patient >50% of vi

## 2021-07-30 ENCOUNTER — EXTERNAL FACILITY (OUTPATIENT)
Dept: INTERNAL MEDICINE CLINIC | Facility: CLINIC | Age: 71
End: 2021-07-30

## 2021-07-30 DIAGNOSIS — M48.061 SPINAL STENOSIS OF LUMBAR REGION, UNSPECIFIED WHETHER NEUROGENIC CLAUDICATION PRESENT: ICD-10-CM

## 2021-07-30 DIAGNOSIS — I10 HYPERTENSION, UNSPECIFIED TYPE: ICD-10-CM

## 2021-07-30 DIAGNOSIS — M17.11 OSTEOARTHRITIS OF RIGHT KNEE, UNSPECIFIED OSTEOARTHRITIS TYPE: ICD-10-CM

## 2021-07-30 DIAGNOSIS — R52 INTRACTABLE PAIN: ICD-10-CM

## 2021-07-30 PROCEDURE — 99306 1ST NF CARE HIGH MDM 50: CPT | Performed by: INTERNAL MEDICINE

## 2021-08-02 ENCOUNTER — EXTERNAL FACILITY (OUTPATIENT)
Dept: INTERNAL MEDICINE CLINIC | Facility: CLINIC | Age: 71
End: 2021-08-02

## 2021-08-02 DIAGNOSIS — M48.061 SPINAL STENOSIS OF LUMBAR REGION, UNSPECIFIED WHETHER NEUROGENIC CLAUDICATION PRESENT: ICD-10-CM

## 2021-08-02 DIAGNOSIS — I10 HYPERTENSION, UNSPECIFIED TYPE: ICD-10-CM

## 2021-08-02 DIAGNOSIS — M17.11 PRIMARY OSTEOARTHRITIS OF RIGHT KNEE: ICD-10-CM

## 2021-08-02 DIAGNOSIS — E03.9 ACQUIRED HYPOTHYROIDISM: ICD-10-CM

## 2021-08-02 PROCEDURE — 99309 SBSQ NF CARE MODERATE MDM 30: CPT | Performed by: INTERNAL MEDICINE

## 2021-08-02 NOTE — DISCHARGE SUMMARY
[facility] - [facility]    Discharge Summary    Conchis Mean Patient Status:  Inpatient    1950 MRN [id number]   Location [facility] 4W/SW/SE Attending No att. providers found   [facility] Day # 2 PCP Bryson Gosselin, MD     Date of Admission pain.  Neurosurgery was also consulted regarding severe spinal stenosis, and they did not recommend any surgical intervention at this time.   Patient worked with PT throughout her hospitalization; PT recommended subacute rehab, and patient was discharged to Oral Tab  Take 80 mg by mouth nightly., Historical    MetFORMIN HCl (GLUCOPHAGE) 500 MG Oral Tab  Take 500 mg by mouth 2 (two) times daily with meals. , Historical    aspirin 81 MG Oral Tab  Take 81 mg by mouth daily. , Historical              Discharge Diet

## 2021-08-03 ENCOUNTER — SNF VISIT (OUTPATIENT)
Dept: INTERNAL MEDICINE CLINIC | Facility: SKILLED NURSING FACILITY | Age: 71
End: 2021-08-03

## 2021-08-03 DIAGNOSIS — M25.562 ACUTE PAIN OF BOTH KNEES: ICD-10-CM

## 2021-08-03 DIAGNOSIS — Z09 FOLLOW UP: ICD-10-CM

## 2021-08-03 DIAGNOSIS — M25.561 ACUTE PAIN OF BOTH KNEES: ICD-10-CM

## 2021-08-03 DIAGNOSIS — G89.29 CHRONIC BILATERAL LOW BACK PAIN WITHOUT SCIATICA: ICD-10-CM

## 2021-08-03 DIAGNOSIS — M54.50 CHRONIC BILATERAL LOW BACK PAIN WITHOUT SCIATICA: ICD-10-CM

## 2021-08-03 DIAGNOSIS — R30.0 DYSURIA: ICD-10-CM

## 2021-08-03 PROCEDURE — 99309 SBSQ NF CARE MODERATE MDM 30: CPT | Performed by: NURSE PRACTITIONER

## 2021-08-06 ENCOUNTER — EXTERNAL FACILITY (OUTPATIENT)
Dept: INTERNAL MEDICINE CLINIC | Facility: CLINIC | Age: 71
End: 2021-08-06

## 2021-08-06 DIAGNOSIS — M17.11 PRIMARY OSTEOARTHRITIS OF RIGHT KNEE: ICD-10-CM

## 2021-08-06 DIAGNOSIS — E03.9 ACQUIRED HYPOTHYROIDISM: ICD-10-CM

## 2021-08-06 DIAGNOSIS — N30.00 ACUTE CYSTITIS WITHOUT HEMATURIA: ICD-10-CM

## 2021-08-06 DIAGNOSIS — I10 HYPERTENSION, UNSPECIFIED TYPE: ICD-10-CM

## 2021-08-06 PROCEDURE — 99309 SBSQ NF CARE MODERATE MDM 30: CPT | Performed by: INTERNAL MEDICINE

## 2021-08-06 NOTE — PROGRESS NOTES
Christopher Ma  : 1950  Age 66year old  female patient is admitted to [facility] for LEANDER. Reason For Visit:Follow up bilateral knee pain and back pain. Follow up Constipation and elevated BP.     HPI  Patient is a 67 y/o A and doing well. VSS and afebrile. Reports dysuria,UA/Ucx ordered and results pending.     Past Medical History:   Diagnosis Date   • Type II or unspecified type diabetes mellitus without mention of complication, not stated as uncontrolled    • Unspecified e moist.  CATHETER/DRAINS/TUBES/LINES:None. Neuro: AOx3, no focal deficit, follows commands, and didn't observe gait. Uses walker or wheel chair. Psych: Normal mood and affect. DIAGNOSTICS/LABS:Reviewed. cbc/bmp 8/9    ASSESSMENT/PLAN:  PLAN  -UA/Uc Pravastatin 80mg at bedtime. -CPM ASA 81mg Daily                Simon Encarnacion, APRN  8/3/2021      25 total minutes spent with patient >50% of visit was spent in counseling and coordination of care.

## 2021-08-09 ENCOUNTER — EXTERNAL FACILITY (OUTPATIENT)
Dept: INTERNAL MEDICINE CLINIC | Facility: CLINIC | Age: 71
End: 2021-08-09

## 2021-08-09 DIAGNOSIS — I10 HYPERTENSION, UNSPECIFIED TYPE: ICD-10-CM

## 2021-08-09 DIAGNOSIS — E03.9 ACQUIRED HYPOTHYROIDISM: ICD-10-CM

## 2021-08-09 DIAGNOSIS — N30.00 ACUTE CYSTITIS WITHOUT HEMATURIA: ICD-10-CM

## 2021-08-09 DIAGNOSIS — M15.9 OSTEOARTHRITIS OF MULTIPLE JOINTS, UNSPECIFIED OSTEOARTHRITIS TYPE: ICD-10-CM

## 2021-08-09 PROCEDURE — 99307 SBSQ NF CARE SF MDM 10: CPT | Performed by: INTERNAL MEDICINE

## 2021-08-21 NOTE — PROGRESS NOTES
Location [facility]  Type in person  Date of service July 30, 2021    Admission note    49-year-old -American lady with a past medical history of diabetes, hypertension, DJD came into the hospital for worsening right knee pain.   Jennyin CARDIOVASCULAR: S1 and S2 heard. RRR , no murmur, no edema  ABDOMEN:  normal active BS+, soft, non-distended; no organomegaly.   EXTREMITIES – no edema BL   NEUROLOGIC: Alert orient to 3, no motor deficits, no sensory deficits  PSYCHIATRIC: Affect appropri

## 2021-08-21 NOTE — PROGRESS NOTES
Location [facility]  Type in person  Date of service August 9 , 2021    Patient seen and examined. Overall feels okay. She is not happy about going home. Her pain is reasonably well controlled. She is tolerating antibiotics.  She is still thin

## 2021-08-21 NOTE — PROGRESS NOTES
Location [facility]  Type in person  Date of service August 6 , 2021    Her pain is on and off. She probably need outpatient orthopedic evaluation in order to optimize her mobility and adequately control her pain.  She was found to have hypoth

## 2021-08-21 NOTE — PROGRESS NOTES
Location [facility]  Type in person  Date of service August 2 , 2021    Patient seen and examined. Her blood pressure has been running high and medication has been adjusted.  Her pain is reasonably well controlled however it gets worse with ac

## 2022-03-07 NOTE — TELEPHONE ENCOUNTER
Dr. Papo Miller, patient is requesting a new prescription for   Bisacodyl 10MG Suppositories, Sig: Unwrap and insert 1 suppository rectally every night at bedtime. Qty 30  Refills   I have pended medication for review.

## 2022-03-07 NOTE — TELEPHONE ENCOUNTER
Pharmacy note: New Rx not on current medication list.    Bisacodyl 10MG Suppositories, Sig: Unwrap and insert 1 suppository rectally every night at bedtime. Qty 30  Refills ?

## 2022-03-08 RX ORDER — BISACODYL 10 MG
10 SUPPOSITORY, RECTAL RECTAL DAILY
Qty: 30 SUPPOSITORY | Refills: 2 | OUTPATIENT
Start: 2022-03-08

## 2022-03-09 NOTE — TELEPHONE ENCOUNTER
Left a message with the patient to call us back. I also called the [employer] [facility] and informed them. They stated they will handle the medication.

## 2023-05-02 ENCOUNTER — HOSPITAL ENCOUNTER (EMERGENCY)
Age: 73
Discharge: HOME | End: 2023-05-02
Attending: STUDENT IN AN ORGANIZED HEALTH CARE EDUCATION/TRAINING PROGRAM

## 2023-05-02 VITALS
SYSTOLIC BLOOD PRESSURE: 143 MMHG | BODY MASS INDEX: 28.66 KG/M2 | HEART RATE: 82 BPM | TEMPERATURE: 98.2 F | OXYGEN SATURATION: 99 % | HEIGHT: 60 IN | DIASTOLIC BLOOD PRESSURE: 57 MMHG | WEIGHT: 146 LBS | RESPIRATION RATE: 18 BRPM

## 2023-05-02 DIAGNOSIS — K62.89 ANAL OR RECTAL PAIN: ICD-10-CM

## 2023-05-02 DIAGNOSIS — K64.5 EXTERNAL THROMBOSED HEMORRHOIDS: Primary | ICD-10-CM

## 2023-05-02 PROCEDURE — 10002801 HB RX 250 W/O HCPCS

## 2023-05-02 PROCEDURE — 99283 EMERGENCY DEPT VISIT LOW MDM: CPT

## 2023-05-02 PROCEDURE — 10004651 HB RX, NO CHARGE ITEM

## 2023-05-02 RX ORDER — HYDROCORTISONE 25 MG/G
1 CREAM TOPICAL 4 TIMES DAILY PRN
Qty: 30 G | Refills: 0 | Status: SHIPPED | OUTPATIENT
Start: 2023-05-02

## 2023-05-02 RX ORDER — LIDOCAINE HYDROCHLORIDE 20 MG/ML
JELLY TOPICAL ONCE
Status: DISCONTINUED | OUTPATIENT
Start: 2023-05-02 | End: 2023-05-02 | Stop reason: HOSPADM

## 2023-05-02 RX ORDER — ACETAMINOPHEN 500 MG
1000 TABLET ORAL ONCE
Status: COMPLETED | OUTPATIENT
Start: 2023-05-02 | End: 2023-05-02

## 2023-05-02 RX ADMIN — ACETAMINOPHEN 1000 MG: 500 TABLET ORAL at 17:17

## 2023-05-02 ASSESSMENT — ENCOUNTER SYMPTOMS
HEMATOLOGIC/LYMPHATIC NEGATIVE: 1
ABDOMINAL PAIN: 0
PSYCHIATRIC NEGATIVE: 1
VOMITING: 0
ENDOCRINE NEGATIVE: 1
BLOOD IN STOOL: 0
ALLERGIC/IMMUNOLOGIC NEGATIVE: 1
NEUROLOGICAL NEGATIVE: 1
DIARRHEA: 0
CONSTIPATION: 0
NAUSEA: 0
RESPIRATORY NEGATIVE: 1
EYES NEGATIVE: 1
ABDOMINAL DISTENTION: 0
RECTAL PAIN: 1
ANAL BLEEDING: 0
CONSTITUTIONAL NEGATIVE: 1

## 2023-05-08 ENCOUNTER — APPOINTMENT (OUTPATIENT)
Dept: SURGERY | Age: 73
End: 2023-05-08

## 2023-05-17 ENCOUNTER — APPOINTMENT (OUTPATIENT)
Dept: SURGERY | Age: 73
End: 2023-05-17

## 2023-06-15 DIAGNOSIS — R01.1 GRADE 2 OUT OF 6 INTENSITY MURMUR: Primary | ICD-10-CM

## 2023-06-19 DIAGNOSIS — K57.92 DIVERTICULITIS: Primary | ICD-10-CM

## 2023-07-13 ENCOUNTER — APPOINTMENT (OUTPATIENT)
Dept: CT IMAGING | Age: 73
End: 2023-07-13
Attending: NURSE PRACTITIONER

## 2023-08-01 ENCOUNTER — HOSPITAL ENCOUNTER (OUTPATIENT)
Dept: CARDIOLOGY | Age: 73
Discharge: HOME | End: 2023-08-01
Attending: NURSE PRACTITIONER

## 2023-08-01 VITALS — WEIGHT: 146 LBS | BODY MASS INDEX: 28.66 KG/M2 | HEIGHT: 60 IN

## 2023-08-01 DIAGNOSIS — R01.1 GRADE 2 OUT OF 6 INTENSITY MURMUR: ICD-10-CM

## 2023-08-01 LAB
AORTIC VALVE AREA (AVA): 0.87
AORTIC VALVE AREA: 1.13
ASCENDING AORTA (AAD): 3
AV MEAN GRADIENT (AVMG): 8
AV MEAN VELOCITY (AVMV): 1.25
AV PEAK GRADIENT (AVPG): 17
AV PEAK VELOCITY (AVPV): 2.05
AV STENOSIS SEVERITY TEXT: NORMAL
AVI LVOT PEAK GRADIENT (LVOTMG): 1.2
E WAVE DECELARATION TIME (MDT): 13.38
INTERVENTRICULAR SEPTUM IN END DIASTOLE (IVSD): 3.01
LEFT INTERNAL DIMENSION IN SYSTOLE (LVSD): 1.3
LEFT VENTRICULAR INTERNAL DIMENSION IN DIASTOLE (LVDD): 2.3
LEFT VENTRICULAR POSTERIOR WALL IN END DIASTOLE (LVPW): 4.1
LV EF: NORMAL %
LVOT 2D (LVOTD): 22.8
LVOT VTI (LVOTVTI): 0.82
MV E TISSUE VEL MED (MESV): 6.74
MV E WAVE VEL/E TISSUE VEL MED(MSR): 6.53
MV PEAK A VELOCITY (MVPAV): 269
MV PEAK E VELOCITY (MVPEV): 1.08
RV END SYSTOLIC LONGITUDINAL STRAIN FREE WALL (RVGS): 1.8
TRICUSPID VALVE ANNULAR PEAK VELOCITY (TVAPV): 27
TRICUSPID VALVE PEAK REGURGITATION VELOCITY (TRPV): 2.3
TV ESTIMATED RIGHT ARTERIAL PRESSURE (RAP): 12.4

## 2023-08-01 PROCEDURE — 93306 TTE W/DOPPLER COMPLETE: CPT | Performed by: INTERNAL MEDICINE

## 2023-08-01 PROCEDURE — 93306 TTE W/DOPPLER COMPLETE: CPT

## 2024-06-01 DIAGNOSIS — Z78.0 MENOPAUSE: Primary | ICD-10-CM

## (undated) NOTE — LETTER
[facility], [place]  Authorization for Invasive Procedures  1.  I hereby authorize Dr. Jerry Hdez , my physician and whomever may be designated as the doctor's assistant, to perform the following operation and/or procedure:  Right k all, of the potential risks that can occur: fever and allergic reactions, hemolytic reactions, transmission of disease such as hepatitis, AIDS, cytomegalovirus (CMV), and flluid overload.  In the event that I wish to have autologous transfusions of my own b desirable in the judgment of my physician.      Signature of Patient:  ________________________________________________ Date: _________Time: _________    Responsible person in case of minor or unconscious: _____________________________Relationship: ________

## (undated) NOTE — LETTER
[facility], [place]  Authorization for Invasive Procedures  1.  I hereby authorize Dr. Santi Stratton , my physician and whomever may be designated as the doctor's assistant, to perform the following operation and/or procedure:  Aspirat fever and allergic reactions, hemolytic reactions, transmission of disease such as hepatitis, AIDS, cytomegalovirus (CMV), and flluid overload.  In the event that I wish to have autologous transfusions of my own blood, or a directed donor transfusion, I ilene Signature of Patient:  ________________________________________________ Date: _________Time: _________    Responsible person in case of minor or unconscious: _____________________________Relationship: ____________     Witness Signature: ___________________

## (undated) NOTE — IP AVS SNAPSHOT
[facility]            (For Outpatient Use Only) Initial Admit Date: 7/22/2021   Inpt/Obs Admit Date: Inpt: 7/25/21 / Obs: 07/22/21   Discharge Date:    Aaliyah Mena:  [de-identified]   MRN: [de-identified]   CSN: [id number]   [address]: [id number] Subscriber Name:  Rhea Jennifer :    Subscriber ID:  Pt Rel to Subscriber:    Hospital Account Financial Class: Medicare Advantage    2021

## (undated) NOTE — IP AVS SNAPSHOT
Patient Demographics     Address  [place] [address] Phone  [phone] [facility])  [phone] [facility]      Emergency Contact(s)     Name Relation Home Work Mirlande Daughter   [phone]    KeithConsuelo Relative [phone] MD JUN         PEG 3350 17 g Pack  Commonly known as: MIRALAX      Take 17 g by mouth daily as needed. Baldomero Osorio MD         Pravastatin Sodium 80 MG Tabs  Commonly known as: PRAVACHOL      Take 80 mg by mouth nightly.           traMADol 50 MG Tabs [id number] gabapentin (NEURONTIN) cap 400 mg 07/26/21 2208 Given      [id number] gabapentin (NEURONTIN) cap 400 mg 07/27/21 0841 Given      [id number] lisinopril tab 20 mg 07/27/21 0841 Given      [id number] traMADol HCl (ULTRAM) tab 50 mg 07/27/21 0436 Given knee pain after twisting her knee a few days ago while getting out of bed. On presentation to the ED, vital signs were within normal limits.   X-ray revealed osteoarthritis of medial and patellofemoral compartments, without acute fracture or dislocation no metFORMIN (GLUCOPHAGE) 500 mg PO tablet   take 500 mg by mouth.     HYDROcodone-acetaminophen (NORCO) 7.5-325 mg PO tablet   take 1 Tablet by mouth every 4 to 6 hours as needed for Pain.    traMADol (ULTRAM) 50 mg PO tablet    Indications: pain take 1 Table 10/31/2017    CA 9.2 10/31/2017    ALB 3.6 10/31/2017    ALKPHO 123 10/31/2017    BILT 0.4 10/31/2017    TP 7.4 10/31/2017    AST 12 (L) 10/31/2017    ALT 43 10/31/2017       XR KNEE (1 OR 2 VIEWS), RIGHT (CPT=73560)    Result Date: 7/22/2021  CONCLUSION: LOCATION:  4WSWSE [address] [facility] #:   [id number]       YOB: 1950  ADMISSION DATE:       07/22/2021      CONSULT DATE:  07/23/2021    REPORT OF CONSULTATION    CHIEF COMPLAINT:  Right knee pain, pain in the left posterior knee r which are enumerated in the chart. ALLERGIES:  No known drug allergies. PHYSICAL EXAMINATION:    GENERAL:  She is resting in bed in no apparent distress. EXTREMITIES:  Pain over the right knee, greater anteriorly.   Some patellofemoral crepitation [id number]  [facility]/  [WH.1]  Electronically signed by Bruce Pineda MD on 7/27/2021  8:05 AM   Attribution Guzman    [address] [facility]. 1 Laure Michael MD on 7/23/2021 10:41 AM                     D/C Summary    No notes of this type exist for this encoun postural awareness. Assisted pt to the bathroom;help with hygiene. Ther ex. Pt ended session sitting up in chair;call light within reach. RN aware. The patient's[CK.1] Approx Degree of Impairment: 57.7%[CK.2] has been calculated based on documentation in the AM (including a wheelchair)?: A Little   -   Need to walk in hospital room?: A Lot   -   Climbing 3-5 steps with a railing?: Total[CK. 2]     AM-PAC Score:[CK.1]  Raw Score: 15   Approx Degree of Impairment: 57.7%   Standardized Score (AM-PAC Scale): 39.45   C Physical Therapy Note signed by Bladimir Last PTA at 7/26/2021 10:49 AM  Version 2 of 2    Author: Bladimir Last PTA Service: Rehab Author Type: Physical Therapist    Filed: 7/26/2021 10:49 AM Date of Service: 7/26/2021 10:32 AM Status:  Jv in the [facility] '6 clicks' Inpatient Basic Mobility Short Form. Research supports that patients with this level of impairment may benefit from Sub-acute Rehabilitation.     DISCHARGE RECOMMENDATIONS[CK.1]  PT Discharge Recommendations: Sub-acute rehabilitatio FUNCTIONAL ABILITY STATUS  Gait Assessment[CK. 1]   Gait Assistance: Minimum assistance  Distance (ft): 2 x 10  Assistive Device: Rolling walker  Pattern: R Decreased stance time  Stoop/Curb Assistance: Not tested[CK. 2]       Additional information:     THE Status: Signed    : Karoline Faust PTA (Physical Therapist)    Related Notes: Addendum by Karoline Faust PTA (Physical Therapist) filed at 7/26/2021 10:49 AM       PHYSICAL THERAPY TREATMENT NOTE - INPATIENT     Room Number: 099/719-K[. PLAN[CK.1]  PT Treatment Plan: Bed mobility[CK. 2]    SUBJECTIVE  Pt reports being ready for PT RX  OBJECTIVE[CK.1]  Precautions: Spine;Limb alert - right[CK. 2]    WEIGHT BEARING RESTRICTION[CK.1]  Weight Bearing Restriction: R lower extremity        R L EXERCISES  Lower Extremity AP,QS,GS     Position supine[CK.1]       Patient End of Session: Up in chair;Call light within reach;RN aware of session/findings;Bracing education provided; All patient questions and concerns addressed[CK. 2]    CURR  Patient Goal for right knee following ortho consultation/post injection 7/25/21. Ortho MD cleared pt for therapy today with a PT order for therapy participation received. Discussed pt status with RN prior to seeing pt .   No plans for neuro sx consultation at this time if she has to get out. Pt reports recent fall when her right LE buckled on her. Pt no longer driving .      Patient's current functional deficits include poor activity tolerance with pain in right LE being primary limitin factor.   Pt with high fall risk Patient will benefit from continued IP PT services to address these deficits in preparation for discharge. DISCHARGE RECOMMENDATIONS[KS. 1]  PT Discharge Recommendations: Sub-acute rehabilitation[KS. 2]    PLAN[KS. 1]  PT Treatment Plan: Bed mobility; Body         Would recommend at this time a trial of conservative treatment, particularly given the radicular and groin symptoms that she has been experiencing, the significant MRI findings on her lumbar sacral spine; the advanced arthritic changes in her right recommend Psychiatric consultation                 Digital transcription software was utilized to produce this note. The note was proofread for content only. Typographical errors may remain.       Regi Messer MD  7/25/2021                    Problem L RANGE OF MOTION AND STRENGTH ASSESSMENT      Pt B UE and left LE ROM and strength grossly WFL for fxn mobility     Pt with decrease right knee/LE  ROM and strength related to increase pain --difficulty to accurately assess due to pain and pt is sensitive t --Ambulation not assessed   SPT with RW bed to chair with pt taking only a few steps  ---pain limiting ability to assess ambulation at this time[KS. 1]   Gait Assistance: Minimum assistance (mod patient effort poor tolerance )  Distance (ft): 2 ft  bed to c 7/25/2021 11:09 AM  Version 3 of 3    Author: Shreyas Collazo PT Service: Rehab Author Type: Physical Therapist    Filed: 7/25/2021 11:09 AM Date of Service: 7/25/2021  7:43 AM Status: Addendum    : Shreyas Collazo PT (Physical Therap /injection for right knee this AM . Also a neuro surgery consult is recommended.  Can ortho MD provide when rounds today on patient post procedure : 1) Therapy requesting WB status for right LE and 2) Is neurosurgery team seeing patient this admission or ca Status: Addendum    : Fermin Levy PT (Physical Therapist)    Related Notes: Original Note by Fermin Levy PT (Physical Therapist) filed at 7/24/2021 12:12 PM       Therapy orders received, chart reviewed.  MRI results received indicat osteoarthritis with articular cartilage loss in the medial and more significantly patellofemoral compartment.     Dictated by (CST): Corina Morales MD on 7/24/2021 at 9:02 AM     Finalized by (CST): Corina Morales MD on 7/24/2021 at 9:38 AM motivated to work with therapy. She states her knee already feels better after the injection. However during session she reports pain at her right hip, groin, and back. Discussed spine precautions for pain management.  She required min a for log roll for dwyer not stated as uncontrolled    • Unspecified essential hypertension        Past Surgical History  Past Surgical History:   Procedure Laterality Date   •      • EXCIS LUMBAR DISK,ONE LEVEL     • ALIZA BIOPSY STEREO NODULE 2 SITE BILAT (CPT=19081/98793 care of personal grooming such as brushing teeth?: A Little  -   Eating meals?: None    AM-PAC Score:  Score: 16  Approx Degree of Impairment: 53.32%  Standardized Score (AM-PAC Scale): 35.96  CMS Modifier (G-Code): CK    FUNCTIONAL TRANSFER ASSESSMENT  Doe Interventions:  - ***  - See additional Care Plan goals for specific interventions   Patient/Family Short Term Goal     Interdisciplinary Adequate for Discharge    Description: Patient's Short Term Goal: ***    Interventions:   - ***  - See additional Care